# Patient Record
Sex: MALE | Race: WHITE | NOT HISPANIC OR LATINO | Employment: FULL TIME | URBAN - METROPOLITAN AREA
[De-identification: names, ages, dates, MRNs, and addresses within clinical notes are randomized per-mention and may not be internally consistent; named-entity substitution may affect disease eponyms.]

---

## 2023-08-07 ENCOUNTER — OFFICE VISIT (OUTPATIENT)
Dept: PRIMARY CARE | Facility: CLINIC | Age: 23
End: 2023-08-07
Payer: COMMERCIAL

## 2023-08-07 VITALS
OXYGEN SATURATION: 98 % | HEIGHT: 72 IN | HEART RATE: 87 BPM | SYSTOLIC BLOOD PRESSURE: 142 MMHG | DIASTOLIC BLOOD PRESSURE: 92 MMHG | WEIGHT: 308 LBS | BODY MASS INDEX: 41.72 KG/M2

## 2023-08-07 DIAGNOSIS — F41.9 ANXIETY: ICD-10-CM

## 2023-08-07 DIAGNOSIS — Z00.00 HEALTHCARE MAINTENANCE: Primary | ICD-10-CM

## 2023-08-07 DIAGNOSIS — R07.9 CHEST PAIN, UNSPECIFIED TYPE: ICD-10-CM

## 2023-08-07 DIAGNOSIS — I10 HYPERTENSION, UNSPECIFIED TYPE: ICD-10-CM

## 2023-08-07 PROBLEM — M22.2X1 PATELLOFEMORAL PAIN SYNDROME OF RIGHT KNEE: Status: ACTIVE | Noted: 2023-08-07

## 2023-08-07 PROBLEM — L70.9 ACNE: Status: ACTIVE | Noted: 2023-08-07

## 2023-08-07 PROBLEM — M25.561 BILATERAL KNEE PAIN: Status: RESOLVED | Noted: 2023-08-07 | Resolved: 2023-08-07

## 2023-08-07 PROBLEM — H66.91 ACUTE RIGHT OTITIS MEDIA: Status: RESOLVED | Noted: 2023-08-07 | Resolved: 2023-08-07

## 2023-08-07 PROBLEM — M25.562 BILATERAL KNEE PAIN: Status: RESOLVED | Noted: 2023-08-07 | Resolved: 2023-08-07

## 2023-08-07 PROBLEM — M22.2X2 PATELLOFEMORAL PAIN SYNDROME OF LEFT KNEE: Status: ACTIVE | Noted: 2023-08-07

## 2023-08-07 PROCEDURE — 3080F DIAST BP >= 90 MM HG: CPT | Performed by: INTERNAL MEDICINE

## 2023-08-07 PROCEDURE — 1036F TOBACCO NON-USER: CPT | Performed by: INTERNAL MEDICINE

## 2023-08-07 PROCEDURE — 3077F SYST BP >= 140 MM HG: CPT | Performed by: INTERNAL MEDICINE

## 2023-08-07 PROCEDURE — 99214 OFFICE O/P EST MOD 30 MIN: CPT | Performed by: INTERNAL MEDICINE

## 2023-08-07 RX ORDER — ESCITALOPRAM OXALATE 5 MG/1
5 TABLET ORAL DAILY
Qty: 90 TABLET | Refills: 1 | Status: SHIPPED | OUTPATIENT
Start: 2023-08-07 | End: 2024-03-04 | Stop reason: ALTCHOICE

## 2023-08-07 RX ORDER — METOPROLOL TARTRATE 25 MG/1
12.5 TABLET, FILM COATED ORAL DAILY
Qty: 90 TABLET | Refills: 1 | Status: SHIPPED | OUTPATIENT
Start: 2023-08-07 | End: 2023-11-05

## 2023-08-07 NOTE — PROGRESS NOTES
Subjective   Patient ID: Humble Kitchen is a 23 y.o. male who presents for Hypertension.  HPI        Patient presents as a follow-up patient no real issues states he had a physical done through [employer] that he just received a new job for he does still note intermittent left-sided chest aches nonradiating last several minutes and goes away none at present grade 0 out of 10 may be worse with stress and anxiety that he seems to have trouble controlling especially in certain situations like when he had a polygraph  Denies dyspnea nausea vomiting diaphoresis fever chills        Health Maintenance:      Colonoscopy:      Mammogram:      Pelvic/Pap:      Low dose chest CT:      Aorta duplex:      Optho:      Podiatry:        Vaccines:      Prevnar 20:      Prevnar 13:      Pneumovax 23:      Tdap:      Shingrix:      COVID:      Influenza:        ROS:      General: denies fever/chills/weight loss      Head: denies HA/trauma/masses/dizziness      Eyes: denies vision change/loss of vision/blurry vision/diplopia/eye pain      Ears: denies hearing loss/tinnitus/otalgia/otorrhea      Nose: denies nasal drainage/anosmia      Throat: denies dysphagia/odynophagia      Lymphatics: denies lymph node swelling      Cardiac: Intermittent left-sided chest pain none at present denies CP/palpitations/orthopnea/PND      Pulmonary: denies dyspnea/cough/wheezing      GI: denies abd pain/n/v/diarrhea/melena/hematochezia/hematemesis      : denies dysuria/hematuria/change frequency      Genital: denies genital discharge/lesions      Skin: denies rashes/lesions/masses      MSK: denies weakness/swelling/edema/gait imbalance/pain      Neuro: denies paresthesias/seizures/dysarthria      Psych: Uncontrolled severe anxiety stress denies depression/anxiety/suicidal or homicidal ideations            Objective   BP (!) 142/92   Pulse 87   Ht 1.829 m (6')   Wt 140 kg (308 lb)   SpO2 98%   BMI 41.77 kg/m²      Physical Exam:      "General: AO3, NAD     Head: atraumatic/NC     Eyes: EOMI/PERRLA. Negative APD     Ears: TM pearly gray, EAC clear. No lesions or erythema     Nose: symmetric nares, no discharge     Throat: trachea midline, uvula midline pink mucosa. No thyromegaly     Lymphatics: no cervical/supraclavicular/ant or posterior cervical adenopathy/axillary/inguinal adenopathy     Breast: not examined     Chest: no deformity or tenderness to palpation     Pulm: CTA b/l, no wheeze/rhonchi/rales. nonlabored     Cardiac: RRR +s1s2, no m/r/g.      GI: soft, NT/ND. Normoactive Bsx4. No rebound/guarding.     Rectal: no examined     MSK: 5/5 strength UE LE. No edema/clubbing/cyanosis     Skin: no rashes/lesions     Vascular: 2+ palp DP PT radials b/l. Negative carotid bruit     Neuro: CNII-XII intact. No focal deficits. Reflexes 2/4 brachioradialis bicep tricep patellar achilles. Finger to nose intact.     Psych: Mildly stressed appearing appropriate mood/affect                    No results found for: \"BMPR1A\", \"CBCDIF\"    Patient reports completing EKG chest x-ray and a stress test at his physical for work but no records on file in the EMR  Assessment/Plan   Diagnoses and all orders for this visit:  Healthcare maintenance  -     CBC and Auto Differential; Future  -     Comprehensive Metabolic Panel; Future  -     Hemoglobin A1C; Future  -     T4, free; Future  -     TSH; Future  Chest pain, unspecified type  -     Referral to Cardiology; Future  -     metoprolol tartrate (Lopressor) 25 mg tablet; Take 0.5 tablets (12.5 mg) by mouth once daily.  Hypertension, unspecified type  -     metoprolol tartrate (Lopressor) 25 mg tablet; Take 0.5 tablets (12.5 mg) by mouth once daily.  Anxiety  -     Referral to Psychiatry; Future  -     escitalopram (Lexapro) 5 mg tablet; Take 1 tablet (5 mg) by mouth once daily.    Goal blood pressure less than 135/85  Low-salt diet weight loss encouraged    Please sign a medical record release for the  " subclinically went to reportedly mentor will be    Go to the ER for recurrent chest pain or other concerning symptoms    Thank you for making appointment today Humble    Please follow-up 3 months       Lydia Claudio MA

## 2023-11-13 ENCOUNTER — APPOINTMENT (OUTPATIENT)
Dept: PRIMARY CARE | Facility: CLINIC | Age: 23
End: 2023-11-13
Payer: COMMERCIAL

## 2023-12-04 ENCOUNTER — OFFICE VISIT (OUTPATIENT)
Dept: PRIMARY CARE | Facility: CLINIC | Age: 23
End: 2023-12-04
Payer: COMMERCIAL

## 2023-12-04 VITALS
OXYGEN SATURATION: 98 % | DIASTOLIC BLOOD PRESSURE: 86 MMHG | HEIGHT: 72 IN | WEIGHT: 299 LBS | HEART RATE: 85 BPM | BODY MASS INDEX: 40.5 KG/M2 | SYSTOLIC BLOOD PRESSURE: 126 MMHG

## 2023-12-04 DIAGNOSIS — F41.9 ANXIETY: ICD-10-CM

## 2023-12-04 DIAGNOSIS — I10 PRIMARY HYPERTENSION: Primary | ICD-10-CM

## 2023-12-04 PROBLEM — L70.0 ACNE VULGARIS: Status: RESOLVED | Noted: 2017-05-01 | Resolved: 2023-12-04

## 2023-12-04 PROBLEM — D22.5 MELANOCYTIC NEVI OF TRUNK: Status: RESOLVED | Noted: 2017-01-13 | Resolved: 2023-12-04

## 2023-12-04 PROCEDURE — 99214 OFFICE O/P EST MOD 30 MIN: CPT | Performed by: INTERNAL MEDICINE

## 2023-12-04 PROCEDURE — 1036F TOBACCO NON-USER: CPT | Performed by: INTERNAL MEDICINE

## 2023-12-04 PROCEDURE — 3079F DIAST BP 80-89 MM HG: CPT | Performed by: INTERNAL MEDICINE

## 2023-12-04 PROCEDURE — 3074F SYST BP LT 130 MM HG: CPT | Performed by: INTERNAL MEDICINE

## 2023-12-04 ASSESSMENT — ENCOUNTER SYMPTOMS
SWEATS: 0
HYPERTENSION: 1
PND: 0
ORTHOPNEA: 0
BLURRED VISION: 0
PALPITATIONS: 0
HEADACHES: 0
NECK PAIN: 0
SHORTNESS OF BREATH: 0

## 2023-12-04 NOTE — PROGRESS NOTES
Subjective   Patient ID: Humble Kitchen is a 23 y.o. male who presents for Follow-up.  Hypertension  This is a recurrent problem. The current episode started more than 1 month ago. The problem has been gradually improving since onset. The problem is controlled. Associated symptoms include anxiety and chest pain. Pertinent negatives include no blurred vision, headaches, malaise/fatigue, neck pain, orthopnea, palpitations, peripheral edema, PND, shortness of breath or sweats. There are no associated agents to hypertension. There are no known risk factors for coronary artery disease. There are no compliance problems.      mononucleosis Diallo-Courtney strep pharyngitis sinusitis hypertension anxiety    Patient overall doing okay states the left-sided chest pain has basically come nonexistent at this point down to grade 0 got better seemingly with blood pressure controlled metoprolol was worse without it        Recently had a work physical and had a 126/78 blood pressure      Health Maintenance:      Colonoscopy:      Mammogram:      Pelvic/Pap:      Low dose chest CT:      Aorta duplex:      Optho:      Podiatry:        Vaccines:      Prevnar 20:      Prevnar 13:      Pneumovax 23:      Tdap:      Shingrix:      COVID:      Influenza:        ROS:      General: denies fever/chills/weight loss      Head: denies HA/trauma/masses/dizziness      Eyes: denies vision change/loss of vision/blurry vision/diplopia/eye pain      Ears: denies hearing loss/tinnitus/otalgia/otorrhea      Nose: denies nasal drainage/anosmia      Throat: denies dysphagia/odynophagia      Lymphatics: denies lymph node swelling      Cardiac: Left-sided chest pain resolved at this time denies CP/palpitations/orthopnea/PND      Pulmonary: denies dyspnea/cough/wheezing      GI: denies abd pain/n/v/diarrhea/melena/hematochezia/hematemesis      : denies dysuria/hematuria/change frequency      Genital: denies genital discharge/lesions      Skin: denies  "rashes/lesions/masses      MSK: denies weakness/swelling/edema/gait imbalance/pain      Neuro: denies paresthesias/seizures/dysarthria      Psych: Still some stress and field training through the [employer] but this plans to finish in 4 to 5 weeks tattoos denies depression/anxiety/suicidal or homicidal ideations            Objective   /88   Pulse 85   Ht 1.829 m (6')   Wt 136 kg (299 lb)   SpO2 98%   BMI 40.55 kg/m²      Physical Exam:     General: AO3, NAD     Head: atraumatic/NC     Eyes: EOMI/PERRLA. Negative APD     Ears: TM pearly gray, EAC clear. No lesions or erythema     Nose: symmetric nares, no discharge     Throat: trachea midline, uvula midline pink mucosa. No thyromegaly     Lymphatics: no cervical/supraclavicular/ant or posterior cervical adenopathy/axillary/inguinal adenopathy     Breast: not examined     Chest: no deformity or tenderness to palpation     Pulm: CTA b/l, no wheeze/rhonchi/rales. nonlabored     Cardiac: RRR +s1s2, no m/r/g.      GI: soft, NT/ND. Normoactive Bsx4. No rebound/guarding.     Rectal: no examined     MSK: 5/5 strength UE LE. No edema/clubbing/cyanosis     Skin:tattoos  no rashes/lesions     Vascular: 2+ palp DP PT radials b/l. Negative carotid bruit     Neuro: CNII-XII intact. No focal deficits. Reflexes 2/4 brachioradialis bicep tricep patellar achilles. Finger to nose intact.     Psych: appropriate mood/affect                    No results found for: \"BMPR1A\", \"CBCDIF\"      Assessment/Plan   Diagnoses and all orders for this visit:  Primary hypertension  Anxiety    Stress reducing techniques should get better as well once field training is completed follow-up with psychiatry as ordered    Home blood pressure check goal less than 140/90 keep a log    Go to the ER for any recurrent chest pain recommend still following up with cardiology for completeness    Call to complete blood work as ordered    Thank you for making appointment today Cameron    Please follow-up 3 " months       Lydia Claudio MA

## 2024-03-04 ENCOUNTER — OFFICE VISIT (OUTPATIENT)
Dept: PRIMARY CARE | Facility: CLINIC | Age: 24
End: 2024-03-04
Payer: COMMERCIAL

## 2024-03-04 ENCOUNTER — LAB (OUTPATIENT)
Dept: LAB | Facility: LAB | Age: 24
End: 2024-03-04
Payer: COMMERCIAL

## 2024-03-04 VITALS
BODY MASS INDEX: 41.45 KG/M2 | HEART RATE: 87 BPM | HEIGHT: 72 IN | DIASTOLIC BLOOD PRESSURE: 86 MMHG | WEIGHT: 306 LBS | OXYGEN SATURATION: 98 % | SYSTOLIC BLOOD PRESSURE: 130 MMHG

## 2024-03-04 DIAGNOSIS — Z00.00 HEALTHCARE MAINTENANCE: Primary | ICD-10-CM

## 2024-03-04 DIAGNOSIS — Z00.00 HEALTHCARE MAINTENANCE: ICD-10-CM

## 2024-03-04 DIAGNOSIS — F41.9 ANXIETY: ICD-10-CM

## 2024-03-04 PROBLEM — M22.2X2 PATELLOFEMORAL PAIN SYNDROME OF LEFT KNEE: Status: RESOLVED | Noted: 2023-08-07 | Resolved: 2024-03-04

## 2024-03-04 PROBLEM — M22.2X1 PATELLOFEMORAL PAIN SYNDROME OF RIGHT KNEE: Status: RESOLVED | Noted: 2023-08-07 | Resolved: 2024-03-04

## 2024-03-04 PROBLEM — L70.9 ACNE: Status: RESOLVED | Noted: 2023-08-07 | Resolved: 2024-03-04

## 2024-03-04 LAB
ALBUMIN SERPL BCP-MCNC: 5.1 G/DL (ref 3.4–5)
ALP SERPL-CCNC: 54 U/L (ref 33–120)
ALT SERPL W P-5'-P-CCNC: 60 U/L (ref 10–52)
ANION GAP SERPL CALC-SCNC: 14 MMOL/L (ref 10–20)
AST SERPL W P-5'-P-CCNC: 40 U/L (ref 9–39)
BASOPHILS # BLD AUTO: 0.04 X10*3/UL (ref 0–0.1)
BASOPHILS NFR BLD AUTO: 0.4 %
BILIRUB SERPL-MCNC: 0.6 MG/DL (ref 0–1.2)
BUN SERPL-MCNC: 17 MG/DL (ref 6–23)
CALCIUM SERPL-MCNC: 9.9 MG/DL (ref 8.6–10.6)
CHLORIDE SERPL-SCNC: 105 MMOL/L (ref 98–107)
CHOLEST SERPL-MCNC: 160 MG/DL (ref 0–199)
CHOLESTEROL/HDL RATIO: 2.8
CO2 SERPL-SCNC: 25 MMOL/L (ref 21–32)
CREAT SERPL-MCNC: 0.91 MG/DL (ref 0.5–1.3)
EGFRCR SERPLBLD CKD-EPI 2021: >90 ML/MIN/1.73M*2
EOSINOPHIL # BLD AUTO: 0.2 X10*3/UL (ref 0–0.7)
EOSINOPHIL NFR BLD AUTO: 2.2 %
ERYTHROCYTE [DISTWIDTH] IN BLOOD BY AUTOMATED COUNT: 13.4 % (ref 11.5–14.5)
EST. AVERAGE GLUCOSE BLD GHB EST-MCNC: 97 MG/DL
GLUCOSE SERPL-MCNC: 101 MG/DL (ref 74–99)
HBA1C MFR BLD: 5 %
HCT VFR BLD AUTO: 46.7 % (ref 41–52)
HDLC SERPL-MCNC: 57.6 MG/DL
HGB BLD-MCNC: 15.5 G/DL (ref 13.5–17.5)
IMM GRANULOCYTES # BLD AUTO: 0.04 X10*3/UL (ref 0–0.7)
IMM GRANULOCYTES NFR BLD AUTO: 0.4 % (ref 0–0.9)
LDLC SERPL CALC-MCNC: 77 MG/DL
LYMPHOCYTES # BLD AUTO: 3.34 X10*3/UL (ref 1.2–4.8)
LYMPHOCYTES NFR BLD AUTO: 37.4 %
MCH RBC QN AUTO: 28 PG (ref 26–34)
MCHC RBC AUTO-ENTMCNC: 33.2 G/DL (ref 32–36)
MCV RBC AUTO: 84 FL (ref 80–100)
MONOCYTES # BLD AUTO: 0.65 X10*3/UL (ref 0.1–1)
MONOCYTES NFR BLD AUTO: 7.3 %
NEUTROPHILS # BLD AUTO: 4.65 X10*3/UL (ref 1.2–7.7)
NEUTROPHILS NFR BLD AUTO: 52.3 %
NON HDL CHOLESTEROL: 102 MG/DL (ref 0–149)
NRBC BLD-RTO: 0 /100 WBCS (ref 0–0)
PLATELET # BLD AUTO: 355 X10*3/UL (ref 150–450)
POTASSIUM SERPL-SCNC: 4.2 MMOL/L (ref 3.5–5.3)
PROT SERPL-MCNC: 7.7 G/DL (ref 6.4–8.2)
RBC # BLD AUTO: 5.54 X10*6/UL (ref 4.5–5.9)
SODIUM SERPL-SCNC: 140 MMOL/L (ref 136–145)
T4 FREE SERPL-MCNC: 1.3 NG/DL (ref 0.78–1.48)
TRIGL SERPL-MCNC: 129 MG/DL (ref 0–149)
TSH SERPL-ACNC: 2.94 MIU/L (ref 0.44–3.98)
VLDL: 26 MG/DL (ref 0–40)
WBC # BLD AUTO: 8.9 X10*3/UL (ref 4.4–11.3)

## 2024-03-04 PROCEDURE — 80061 LIPID PANEL: CPT

## 2024-03-04 PROCEDURE — 1036F TOBACCO NON-USER: CPT | Performed by: INTERNAL MEDICINE

## 2024-03-04 PROCEDURE — 85025 COMPLETE CBC W/AUTO DIFF WBC: CPT

## 2024-03-04 PROCEDURE — 84439 ASSAY OF FREE THYROXINE: CPT

## 2024-03-04 PROCEDURE — 99214 OFFICE O/P EST MOD 30 MIN: CPT | Performed by: INTERNAL MEDICINE

## 2024-03-04 PROCEDURE — 83036 HEMOGLOBIN GLYCOSYLATED A1C: CPT

## 2024-03-04 PROCEDURE — 84443 ASSAY THYROID STIM HORMONE: CPT

## 2024-03-04 PROCEDURE — 80053 COMPREHEN METABOLIC PANEL: CPT

## 2024-03-04 PROCEDURE — 36415 COLL VENOUS BLD VENIPUNCTURE: CPT

## 2024-03-04 RX ORDER — ESCITALOPRAM OXALATE 10 MG/1
10 TABLET ORAL DAILY
Qty: 90 TABLET | Refills: 1 | Status: SHIPPED | OUTPATIENT
Start: 2024-03-04 | End: 2024-05-03 | Stop reason: SDUPTHER

## 2024-03-04 ASSESSMENT — PATIENT HEALTH QUESTIONNAIRE - PHQ9
2. FEELING DOWN, DEPRESSED OR HOPELESS: NOT AT ALL
1. LITTLE INTEREST OR PLEASURE IN DOING THINGS: NOT AT ALL
SUM OF ALL RESPONSES TO PHQ9 QUESTIONS 1 AND 2: 0

## 2024-03-04 ASSESSMENT — ENCOUNTER SYMPTOMS: HYPERTENSION: 1

## 2024-03-04 NOTE — PROGRESS NOTES
Subjective   Patient ID: Humble Kitchen is a 23 y.o. male who presents for Follow-up and Med Refill (Would like to discuss his meds before refilling them.).  mononucleosis Diallo-Courtney strep pharyngitis sinusitis hypertension anxiety    Patient overall doing okay aside from uncontrolled anxiety over the last several months grade 8 out of 10 the current Lexapro dose 5 mg is not helping as much of the like worse with a lot of work type stress Home life finding a house        Health Maintenance:      Colonoscopy:      Mammogram:      Pelvic/Pap:      Low dose chest CT:      Aorta duplex:      Optho:      Podiatry:        Vaccines:      Prevnar 20:      Prevnar 13:      Pneumovax 23:      Tdap:      Shingrix:      COVID:      Influenza:        ROS:      General: 7 pound weight gain denies fever/chills/weight loss      Head: denies HA/trauma/masses/dizziness      Eyes: denies vision change/loss of vision/blurry vision/diplopia/eye pain      Ears: denies hearing loss/tinnitus/otalgia/otorrhea      Nose: denies nasal drainage/anosmia      Throat: denies dysphagia/odynophagia      Lymphatics: denies lymph node swelling      Cardiac:  denies CP/palpitations/orthopnea/PND      Pulmonary: denies dyspnea/cough/wheezing      GI: denies abd pain/n/v/diarrhea/melena/hematochezia/hematemesis      : denies dysuria/hematuria/change frequency      Genital: denies genital discharge/lesions      Skin: denies rashes/lesions/masses      MSK: denies weakness/swelling/edema/gait imbalance/pain      Neuro: denies paresthesias/seizures/dysarthria      Psych: Stress anxiety uncontrolled currently denies depression/anxiety/suicidal or homicidal ideations            Objective   /86   Pulse 87   Ht 1.829 m (6')   Wt 139 kg (306 lb)   SpO2 98%   BMI 41.50 kg/m²      Physical Exam:     General: AO3, NAD     Head: atraumatic/NC     Eyes: EOMI/PERRLA. Negative APD     Ears: TM pearly gray, EAC clear. No lesions or erythema     Nose:  "symmetric nares, no discharge     Throat: trachea midline, uvula midline pink mucosa. No thyromegaly     Lymphatics: no cervical/supraclavicular/ant or posterior cervical adenopathy/axillary/inguinal adenopathy     Breast: not examined     Chest: no deformity or tenderness to palpation     Pulm: CTA b/l, no wheeze/rhonchi/rales. nonlabored     Cardiac: RRR +s1s2, no m/r/g.      GI: soft, NT/ND. Normoactive Bsx4. No rebound/guarding.     Rectal: no examined     MSK: 5/5 strength UE LE. No edema/clubbing/cyanosis     Skin:tattoos  no rashes/lesions     Vascular: 2+ palp DP PT radials b/l. Negative carotid bruit     Neuro: CNII-XII intact. No focal deficits. Reflexes 2/4 brachioradialis bicep tricep patellar achilles. Finger to nose intact.     Psych: appropriate mood/affect                    No results found for: \"BMPR1A\", \"CBCDIF\"      Assessment/Plan   Diagnoses and all orders for this visit:  Healthcare maintenance  -     CBC and Auto Differential; Future  -     Comprehensive Metabolic Panel; Future  -     Thyroxine, Free; Future  -     Thyroid Stimulating Hormone; Future  -     Lipid Panel; Future  -     Hemoglobin A1C; Future  Anxiety  -     escitalopram (Lexapro) 10 mg tablet; Take 1 tablet (10 mg) by mouth once daily.    Stress reducing techniques   As you stated you have a counselor through work recommend following up with counseling as well    Home blood pressure check goal less than 140/90 keep a log    Call and follow-up with cardiology    Go to the ER for any recurrent chest pain recommend still following up with cardiology for completeness        Thank you for making appointment today Cameron    Please follow-up 2 months    Froylan Jacobo DO, FACOI       Froylan Jacobo DO  "

## 2024-03-11 DIAGNOSIS — R79.89 ELEVATED LFTS: Primary | ICD-10-CM

## 2024-03-12 ENCOUNTER — TELEPHONE (OUTPATIENT)
Dept: PRIMARY CARE | Facility: CLINIC | Age: 24
End: 2024-03-12
Payer: COMMERCIAL

## 2024-03-12 NOTE — TELEPHONE ENCOUNTER
Spoke with patient and notified him of his lab results and recommendations.    ----- Message from Froylan Jacobo DO sent at 3/11/2024  4:55 PM EDT -----  Lydia  Please notify patient mild elevation in the liver enzymes AST 40 ALT 60 he should avoid alcohol use recheck the liver function in 1 month as ordered drink plenty of water 12 ounces 3 times a day and also check a liver ultrasound as the next step.  Otherwise currently available lab work is stable normal. if any further questions or would like an in office result review please schedule follow-up next 2 to 4 weeks thank you

## 2024-05-02 PROBLEM — F41.9 ANXIETY: Status: ACTIVE | Noted: 2024-05-02

## 2024-05-02 PROBLEM — R07.9 CHEST PAIN: Status: RESOLVED | Noted: 2024-05-02 | Resolved: 2024-05-02

## 2024-05-02 PROBLEM — I10 HYPERTENSION: Status: ACTIVE | Noted: 2024-05-02

## 2024-05-03 ENCOUNTER — LAB (OUTPATIENT)
Dept: LAB | Facility: LAB | Age: 24
End: 2024-05-03
Payer: COMMERCIAL

## 2024-05-03 ENCOUNTER — OFFICE VISIT (OUTPATIENT)
Dept: PRIMARY CARE | Facility: CLINIC | Age: 24
End: 2024-05-03
Payer: COMMERCIAL

## 2024-05-03 VITALS
SYSTOLIC BLOOD PRESSURE: 124 MMHG | HEIGHT: 72 IN | HEART RATE: 73 BPM | OXYGEN SATURATION: 98 % | WEIGHT: 306 LBS | DIASTOLIC BLOOD PRESSURE: 86 MMHG | BODY MASS INDEX: 41.45 KG/M2

## 2024-05-03 DIAGNOSIS — F41.9 ANXIETY: ICD-10-CM

## 2024-05-03 DIAGNOSIS — R79.89 ELEVATED LFTS: ICD-10-CM

## 2024-05-03 DIAGNOSIS — R79.89 ELEVATED LFTS: Primary | ICD-10-CM

## 2024-05-03 PROCEDURE — 36415 COLL VENOUS BLD VENIPUNCTURE: CPT

## 2024-05-03 PROCEDURE — 3074F SYST BP LT 130 MM HG: CPT | Performed by: INTERNAL MEDICINE

## 2024-05-03 PROCEDURE — 3079F DIAST BP 80-89 MM HG: CPT | Performed by: INTERNAL MEDICINE

## 2024-05-03 PROCEDURE — 99213 OFFICE O/P EST LOW 20 MIN: CPT | Performed by: INTERNAL MEDICINE

## 2024-05-03 PROCEDURE — 80053 COMPREHEN METABOLIC PANEL: CPT

## 2024-05-03 PROCEDURE — 1036F TOBACCO NON-USER: CPT | Performed by: INTERNAL MEDICINE

## 2024-05-03 RX ORDER — ESCITALOPRAM OXALATE 10 MG/1
10 TABLET ORAL DAILY
Qty: 90 TABLET | Refills: 1 | Status: SHIPPED | OUTPATIENT
Start: 2024-05-03

## 2024-05-03 ASSESSMENT — PATIENT HEALTH QUESTIONNAIRE - PHQ9
1. LITTLE INTEREST OR PLEASURE IN DOING THINGS: NOT AT ALL
SUM OF ALL RESPONSES TO PHQ9 QUESTIONS 1 AND 2: 0
2. FEELING DOWN, DEPRESSED OR HOPELESS: NOT AT ALL

## 2024-05-03 NOTE — PROGRESS NOTES
Subjective   Patient ID: Humble Kitchen is a 23 y.o. male who presents for No chief complaint on file..  HPI  Subjective   Patient ID: Humble Kitchen is a 23 y.o. male who presents for No chief complaint on file..  mononucleosis Diallo-Courtney strep pharyngitis sinusitis hypertension anxiety      Patient overall is doing well the Lexapro higher dose 10 mg is helping he is looking for houses at this time and planning for a wedding this coming up in a couple months        Health Maintenance:      Colonoscopy:      Mammogram:      Pelvic/Pap:      Low dose chest CT:      Aorta duplex:      Optho:      Podiatry:        Vaccines:      Prevnar 20:      Prevnar 13:      Pneumovax 23:      Tdap:      Shingrix:      COVID:      Influenza:        ROS:      General: denies fever/chills/weight loss      Head: denies HA/trauma/masses/dizziness      Eyes: denies vision change/loss of vision/blurry vision/diplopia/eye pain      Ears: denies hearing loss/tinnitus/otalgia/otorrhea      Nose: denies nasal drainage/anosmia      Throat: denies dysphagia/odynophagia      Lymphatics: denies lymph node swelling      Cardiac:  denies CP/palpitations/orthopnea/PND      Pulmonary: denies dyspnea/cough/wheezing      GI: denies abd pain/n/v/diarrhea/melena/hematochezia/hematemesis      : denies dysuria/hematuria/change frequency      Genital: denies genital discharge/lesions      Skin: denies rashes/lesions/masses      MSK: denies weakness/swelling/edema/gait imbalance/pain      Neuro: denies paresthesias/seizures/dysarthria      Psych: Stress anxiety improved control present with the Lexapro dose he is on denies depression/anxiety/suicidal or homicidal ideations            Objective   /86   Pulse 73   Ht 1.829 m (6')   Wt 139 kg (306 lb)   SpO2 98%   BMI 41.50 kg/m²      Physical Exam:     General: AO3, NAD     Head: atraumatic/NC     Eyes: EOMI/PERRLA. Negative APD     Ears: TM pearly gray, EAC clear. No lesions or erythema      "Nose: symmetric nares, no discharge     Throat: trachea midline, uvula midline pink mucosa. No thyromegaly     Lymphatics: no cervical/supraclavicular/ant or posterior cervical adenopathy/axillary/inguinal adenopathy     Breast: not examined     Chest: no deformity or tenderness to palpation     Pulm: CTA b/l, no wheeze/rhonchi/rales. nonlabored     Cardiac: RRR +s1s2, no m/r/g.      GI: soft, NT/ND. Normoactive Bsx4. No rebound/guarding.     Rectal: no examined     MSK: 5/5 strength UE LE. No edema/clubbing/cyanosis     Skin:tattoos  no rashes/lesions     Vascular: 2+ palp DP PT radials b/l. Negative carotid bruit     Neuro: CNII-XII intact. No focal deficits. Reflexes 2/4 brachioradialis bicep tricep patellar achilles. Finger to nose intact.     Psych: appropriate mood/affect                    No results found for: \"BMPR1A\", \"CBCDIF\"      Assessment/Plan   Diagnoses and all orders for this visit:  Elevated LFTs  Comments:  Likely from alcohol use versus other  Orders:  -     Comprehensive Metabolic Panel; Future  Anxiety  -     escitalopram (Lexapro) 10 mg tablet; Take 1 tablet (10 mg) by mouth once daily.    Stress reducing techniques   As you stated you have a counselor through work recommend following up with counseling as well    Home blood pressure check goal less than 140/90 keep a log    Call and follow-up with cardiology    Go to the ER for any recurrent chest pain recommend still following up with cardiology for completeness    Screening blood work due March thousand 25    Thank you for making appointment today Cameron    Please follow-up 3 months    Froylan Jacobo DO, FACNURY Jacobo DO              Health Maintenance:      Colonoscopy:      Mammogram:      Pelvic/Pap:      Low dose chest CT:      Aorta duplex:      Optho:      Podiatry:        Vaccines:      Refer to Epic Vaccination Log        ROS:      General: denies fever/chills/weight loss      Head: denies HA/trauma/masses/dizziness      " "Eyes: denies vision change/loss of vision/blurry vision/diplopia/eye pain      Ears: denies hearing loss/tinnitus/otalgia/otorrhea      Nose: denies nasal drainage/anosmia      Throat: denies dysphagia/odynophagia      Lymphatics: denies lymph node swelling      Breast: denies masses/discharge/dimpling/skin changes      Cardiac: denies CP/palpitations/orthopnea/PND      Pulmonary: denies dyspnea/cough/wheezing      GI: denies abd pain/n/v/diarrhea/melena/hematochezia/hematemesis      : denies dysuria/hematuria/change frequency      Genital: denies genital discharge/lesions      Skin: denies rashes/lesions/masses      MSK: denies weakness/swelling/edema/gait imbalance/pain      Neuro: denies paresthesias/seizures/dysarthria      Psych: denies depression/anxiety/suicidal or homicidal ideations            Objective   /86   Pulse 73   Ht 1.829 m (6')   Wt 139 kg (306 lb)   SpO2 98%   BMI 41.50 kg/m²      Physical Exam:     General: AO3, NAD     Head: atraumatic/NC     Eyes: EOMI/PERRLA. Negative APD     Ears: TM pearly gray, EAC clear. No lesions or erythema     Nose: symmetric nares, no discharge     Throat: trachea midline, uvula midline pink mucosa. No thyromegaly     Lymphatics: no cervical/supraclavicular/ant or posterior cervical adenopathy/axillary/inguinal adenopathy     Breast: not examined     Chest: no deformity or tenderness to palpation     Pulm: CTA b/l, no wheeze/rhonchi/rales. nonlabored     Cardiac: RRR +s1s2, no m/r/g.      GI: soft, NT/ND. Normoactive Bsx4. No rebound/guarding.     Rectal: not examined     Genital: not examined     MSK: 5/5 strength UE LE. No edema/clubbing/cyanosis     Skin: no rashes/lesions     Vascular: 2+ palp DP PT radials b/l. Negative carotid bruit     Neuro: CNII-XII intact. No focal deficits. Reflexes 2/4 brachioradialis bicep tricep patellar achilles. Finger to nose intact.     Psych: appropriate mood/affect                    No results found for: \"BMPR1A\", " "\"CBCDIF\"      Assessment/Plan            Froylansuzy Jacobo DO  "

## 2024-05-04 LAB
ALBUMIN SERPL BCP-MCNC: 5 G/DL (ref 3.4–5)
ALP SERPL-CCNC: 52 U/L (ref 33–120)
ALT SERPL W P-5'-P-CCNC: 57 U/L (ref 10–52)
ANION GAP SERPL CALC-SCNC: 13 MMOL/L (ref 10–20)
AST SERPL W P-5'-P-CCNC: 42 U/L (ref 9–39)
BILIRUB SERPL-MCNC: 0.8 MG/DL (ref 0–1.2)
BUN SERPL-MCNC: 16 MG/DL (ref 6–23)
CALCIUM SERPL-MCNC: 9.7 MG/DL (ref 8.6–10.6)
CHLORIDE SERPL-SCNC: 103 MMOL/L (ref 98–107)
CO2 SERPL-SCNC: 28 MMOL/L (ref 21–32)
CREAT SERPL-MCNC: 0.86 MG/DL (ref 0.5–1.3)
EGFRCR SERPLBLD CKD-EPI 2021: >90 ML/MIN/1.73M*2
GLUCOSE SERPL-MCNC: 92 MG/DL (ref 74–99)
POTASSIUM SERPL-SCNC: 4.2 MMOL/L (ref 3.5–5.3)
PROT SERPL-MCNC: 7.6 G/DL (ref 6.4–8.2)
SODIUM SERPL-SCNC: 140 MMOL/L (ref 136–145)

## 2024-05-11 ENCOUNTER — HOSPITAL ENCOUNTER (EMERGENCY)
Facility: HOSPITAL | Age: 24
End: 2024-05-12
Attending: EMERGENCY MEDICINE
Payer: COMMERCIAL
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
## 2024-05-11 DIAGNOSIS — I46.8 TRAUMATIC CARDIAC ARREST: Primary | ICD-10-CM

## 2024-05-11 LAB
ANION GAP BLDV CALCULATED.4IONS-SCNC: ABNORMAL MMOL/L
BASE EXCESS BLDV CALC-SCNC: ABNORMAL MMOL/L
BLOOD EXPIRATION DATE: NORMAL
BODY TEMPERATURE: 37 DEGREES CELSIUS
CA-I BLDV-SCNC: ABNORMAL MMOL/L
CHLORIDE BLDV-SCNC: 123 MMOL/L (ref 98–107)
DISPENSE STATUS: NORMAL
GLUCOSE BLDV-MCNC: 94 MG/DL (ref 74–99)
HCO3 BLDV-SCNC: ABNORMAL MMOL/L
HCT VFR BLD EST: 20 % (ref 41–52)
HGB BLDV-MCNC: 6.7 G/DL (ref 13.5–17.5)
LACTATE BLDV-SCNC: 6.5 MMOL/L (ref 0.4–2)
OXYHGB MFR BLDV: 43.1 % (ref 45–75)
PCO2 BLDV: 73 MM HG (ref 41–51)
PH BLDV: <6.8 PH (ref 7.33–7.43)
PO2 BLDV: 46 MM HG (ref 35–45)
POTASSIUM BLDV-SCNC: 2.9 MMOL/L (ref 3.5–5.3)
PRODUCT BLOOD TYPE: 6200
PRODUCT CODE: NORMAL
SAO2 % BLDV: 43 % (ref 45–75)
SODIUM BLDV-SCNC: 145 MMOL/L (ref 136–145)
UNIT ABO: NORMAL
UNIT NUMBER: NORMAL
UNIT RH: NORMAL
UNIT VOLUME: 211
UNIT VOLUME: 212
UNIT VOLUME: 263
UNIT VOLUME: 297
UNIT VOLUME: 300
UNIT VOLUME: 300

## 2024-05-11 PROCEDURE — 36556 INSERT NON-TUNNEL CV CATH: CPT | Performed by: SURGERY

## 2024-05-11 PROCEDURE — 36556 INSERT NON-TUNNEL CV CATH: CPT

## 2024-05-11 PROCEDURE — G0390 TRAUMA RESPONS W/HOSP CRITI: HCPCS

## 2024-05-11 PROCEDURE — 36680 INSERT NEEDLE BONE CAVITY: CPT | Performed by: EMERGENCY MEDICINE

## 2024-05-11 PROCEDURE — 36430 TRANSFUSION BLD/BLD COMPNT: CPT | Mod: 59

## 2024-05-11 PROCEDURE — 31500 INSERT EMERGENCY AIRWAY: CPT

## 2024-05-11 PROCEDURE — 82435 ASSAY OF BLOOD CHLORIDE: CPT

## 2024-05-11 PROCEDURE — 99285 EMERGENCY DEPT VISIT HI MDM: CPT | Performed by: HEALTH CARE PROVIDER

## 2024-05-11 PROCEDURE — 99285 EMERGENCY DEPT VISIT HI MDM: CPT | Performed by: EMERGENCY MEDICINE

## 2024-05-11 PROCEDURE — P9017 PLASMA 1 DONOR FRZ W/IN 8 HR: HCPCS

## 2024-05-11 PROCEDURE — 32160 OPEN CHEST HEART MASSAGE: CPT | Performed by: SURGERY

## 2024-05-11 PROCEDURE — 32551 INSERTION OF CHEST TUBE: CPT | Performed by: SURGERY

## 2024-05-11 PROCEDURE — 31500 INSERT EMERGENCY AIRWAY: CPT | Performed by: EMERGENCY MEDICINE

## 2024-05-11 PROCEDURE — 32551 INSERTION OF CHEST TUBE: CPT | Mod: 59

## 2024-05-11 PROCEDURE — 32160 OPEN CHEST HEART MASSAGE: CPT | Mod: CA

## 2024-05-11 PROCEDURE — 2500000004 HC RX 250 GENERAL PHARMACY W/ HCPCS (ALT 636 FOR OP/ED): Performed by: SURGERY

## 2024-05-11 PROCEDURE — 32551 INSERTION OF CHEST TUBE: CPT | Performed by: EMERGENCY MEDICINE

## 2024-05-11 PROCEDURE — 92950 HEART/LUNG RESUSCITATION CPR: CPT | Performed by: EMERGENCY MEDICINE

## 2024-05-11 PROCEDURE — P9016 RBC LEUKOCYTES REDUCED: HCPCS

## 2024-05-11 PROCEDURE — 99205 OFFICE O/P NEW HI 60 MIN: CPT | Performed by: SURGERY

## 2024-05-11 PROCEDURE — 94002 VENT MGMT INPAT INIT DAY: CPT

## 2024-05-11 PROCEDURE — 2500000005 HC RX 250 GENERAL PHARMACY W/O HCPCS: Performed by: SURGERY

## 2024-05-11 PROCEDURE — 99285 EMERGENCY DEPT VISIT HI MDM: CPT | Mod: 25

## 2024-05-11 PROCEDURE — 36680 INSERT NEEDLE BONE CAVITY: CPT

## 2024-05-11 RX ORDER — CALCIUM CHLORIDE INJECTION 100 MG/ML
INJECTION, SOLUTION INTRAVENOUS CODE/TRAUMA/SEDATION MEDICATION
Status: COMPLETED | OUTPATIENT
Start: 2024-05-11 | End: 2024-05-11

## 2024-05-11 RX ORDER — EPINEPHRINE 0.1 MG/ML
INJECTION INTRACARDIAC; INTRAVENOUS CODE/TRAUMA/SEDATION MEDICATION
Status: COMPLETED | OUTPATIENT
Start: 2024-05-11 | End: 2024-05-11

## 2024-05-11 RX ADMIN — CALCIUM CHLORIDE 2 G: 100 INJECTION INTRAVENOUS; INTRAVENTRICULAR at 22:52

## 2024-05-11 RX ADMIN — EPINEPHRINE 1 MG: 0.1 INJECTION INTRAVENOUS at 22:48

## 2024-05-11 RX ADMIN — SODIUM CHLORIDE 1500 ML: 9 INJECTION, SOLUTION INTRAVENOUS at 22:30

## 2024-05-11 RX ADMIN — EPINEPHRINE 1 MG: 0.1 INJECTION INTRAVENOUS at 22:32

## 2024-05-11 RX ADMIN — EPINEPHRINE 1 MG: 0.1 INJECTION INTRAVENOUS at 22:52

## 2024-05-11 RX ADMIN — EPINEPHRINE 1 MG: 0.1 INJECTION INTRAVENOUS at 22:35

## 2024-05-12 LAB
BLOOD EXPIRATION DATE: NORMAL
DISPENSE STATUS: NORMAL
PRODUCT BLOOD TYPE: 9500
PRODUCT CODE: NORMAL
UNIT ABO: NORMAL
UNIT NUMBER: NORMAL
UNIT RH: NORMAL
UNIT VOLUME: 281
UNIT VOLUME: 288
UNIT VOLUME: 350

## 2024-05-12 PROCEDURE — 32160 OPEN CHEST HEART MASSAGE: CPT | Performed by: SURGERY

## 2024-05-12 PROCEDURE — P9016 RBC LEUKOCYTES REDUCED: HCPCS

## 2024-05-12 PROCEDURE — 32551 INSERTION OF CHEST TUBE: CPT | Mod: 59

## 2024-05-12 PROCEDURE — 31500 INSERT EMERGENCY AIRWAY: CPT

## 2024-05-12 PROCEDURE — 36680 INSERT NEEDLE BONE CAVITY: CPT

## 2024-05-12 PROCEDURE — P9017 PLASMA 1 DONOR FRZ W/IN 8 HR: HCPCS

## 2024-05-12 PROCEDURE — 32160 OPEN CHEST HEART MASSAGE: CPT | Mod: CA

## 2024-05-12 PROCEDURE — 36556 INSERT NON-TUNNEL CV CATH: CPT

## 2024-05-12 NOTE — ED PROCEDURE NOTE
Procedure  Chest Tube Insertion    Performed by: Jesika Best DO  Authorized by: Scott Pearson MD MPH    Consent:     Consent obtained:  Emergent situation    Consent given by:  Healthcare agent  Universal protocol:     Patient identity confirmed:  Anonymous protocol, patient vented/unresponsive  Sedation:     Sedation type:  None  Anesthesia:     Anesthesia method:  None  Procedure details:     Placement location:  R lateral    Scalpel size:  10    Tube size (Fr):  32    Dissection instrument:  Finger and Betty clamp    Ultrasound guidance: no      Tension pneumothorax: no      Tube connected to:  Suction    Drainage characteristics:  Bloody    Suture material:  2-0 silk    Dressing:  Xeroform gauze and petrolatum-impregnated gauze               Jesika Best DO  Resident  05/12/24 0020
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
## 2024-05-13 LAB
BLOOD EXPIRATION DATE: NORMAL
DISPENSE STATUS: NORMAL
PRODUCT BLOOD TYPE: 8400
PRODUCT BLOOD TYPE: 8400
PRODUCT BLOOD TYPE: 9500
PRODUCT CODE: NORMAL
UNIT ABO: NORMAL
UNIT NUMBER: NORMAL
UNIT RH: NORMAL
UNIT VOLUME: 199
UNIT VOLUME: 215
UNIT VOLUME: 273
UNIT VOLUME: 278
UNIT VOLUME: 282
UNIT VOLUME: 282
UNIT VOLUME: 292
UNIT VOLUME: 350

## 2024-05-13 NOTE — ED PROCEDURE NOTE
Procedure  Intubation    Performed by: Devan Rosas DO  Authorized by: Scott Pearson MD MPH    Consent:     Consent obtained:  Emergent situation  Pre-procedure details:     Indications: cardio/pulmonary arrest      Patient status:  Unresponsive    Pharmacologic strategy: none      Induction agents:  None    Paralytics:  None  Procedure details:     Preoxygenation:  Supraglottic device    CPR in progress: yes      Number of attempts:  1  Successful intubation attempt details:     Intubation method:  Oral    Intubation technique: direct and video assisted      Laryngoscope blade:  Hypercurved    Grade view: I      Tube size (mm):  8.0    Tube type:  Cuffed    Tube visualized through cords: yes    Placement assessment:     ETT at teeth/gumline (cm):  24    Tube secured with:  ETT renteria    Placement verification: chest rise and direct visualization    Post-procedure details:     Procedure completion:  Tolerated               Devan Rosas DO  Resident  05/12/24 [id number]

## 2024-08-05 ENCOUNTER — APPOINTMENT (OUTPATIENT)
Dept: PRIMARY CARE | Facility: CLINIC | Age: 24
End: 2024-08-05
Payer: COMMERCIAL